# Patient Record
Sex: FEMALE | Race: BLACK OR AFRICAN AMERICAN | NOT HISPANIC OR LATINO | URBAN - NONMETROPOLITAN AREA
[De-identification: names, ages, dates, MRNs, and addresses within clinical notes are randomized per-mention and may not be internally consistent; named-entity substitution may affect disease eponyms.]

---

## 2017-07-12 PROBLEM — H52.13: Noted: 2017-07-12

## 2019-01-15 ENCOUNTER — IMPORTED ENCOUNTER (OUTPATIENT)
Dept: URBAN - NONMETROPOLITAN AREA CLINIC 1 | Facility: CLINIC | Age: 24
End: 2019-01-15

## 2019-01-15 PROCEDURE — 92014 COMPRE OPH EXAM EST PT 1/>: CPT

## 2019-01-15 PROCEDURE — 92015 DETERMINE REFRACTIVE STATE: CPT

## 2019-01-15 PROCEDURE — 92310 CONTACT LENS FITTING OU: CPT

## 2019-01-15 NOTE — PATIENT DISCUSSION
Myopia OUDiscussed refractive status in detail with patientNew contact Rx given todayDiscussed proper care replacement and hyigene

## 2020-11-05 ENCOUNTER — IMPORTED ENCOUNTER (OUTPATIENT)
Dept: URBAN - NONMETROPOLITAN AREA CLINIC 1 | Facility: CLINIC | Age: 25
End: 2020-11-05

## 2020-11-05 PROCEDURE — 92015 DETERMINE REFRACTIVE STATE: CPT

## 2020-11-05 PROCEDURE — 92310 CONTACT LENS FITTING OU: CPT

## 2020-11-05 PROCEDURE — 92014 COMPRE OPH EXAM EST PT 1/>: CPT

## 2020-11-05 NOTE — PATIENT DISCUSSION
Myopia OUDiscussed refractive status in detail with patient. New contact Rx given today. Discussed proper care replacement and hyigene. Continue to monitor.

## 2022-04-10 ASSESSMENT — KERATOMETRY
OS_K1POWER_DIOPTERS: 43.50
OD_K1POWER_DIOPTERS: 43.00
OS_K2POWER_DIOPTERS: 43.75
OS_K2POWER_DIOPTERS: 43.75
OS_K1POWER_DIOPTERS: 43.25
OS_AXISANGLE_DEGREES: 019
OS_AXISANGLE_DEGREES: 013
OD_K2POWER_DIOPTERS: 43.50
OD_AXISANGLE_DEGREES: 157
OD_K2POWER_DIOPTERS: 43.50
OD_K1POWER_DIOPTERS: 43.00
OD_AXISANGLE_DEGREES: 158

## 2022-04-10 ASSESSMENT — VISUAL ACUITY
OD_SC: 20/20
OD_SC: 20/20-
OS_SC: 20/20
OS_SC: 20/20-